# Patient Record
Sex: FEMALE | Race: WHITE
[De-identification: names, ages, dates, MRNs, and addresses within clinical notes are randomized per-mention and may not be internally consistent; named-entity substitution may affect disease eponyms.]

---

## 2017-11-17 ENCOUNTER — HOSPITAL ENCOUNTER (OUTPATIENT)
Dept: HOSPITAL 63 - SURG | Age: 72
Discharge: HOME | End: 2017-11-17
Attending: ANESTHESIOLOGY
Payer: MEDICARE

## 2017-11-17 DIAGNOSIS — M25.552: ICD-10-CM

## 2017-11-17 DIAGNOSIS — I25.10: ICD-10-CM

## 2017-11-17 DIAGNOSIS — M25.551: Primary | ICD-10-CM

## 2017-11-17 PROCEDURE — 99204 OFFICE O/P NEW MOD 45 MIN: CPT

## 2020-11-11 ENCOUNTER — HOSPITAL ENCOUNTER (EMERGENCY)
Dept: HOSPITAL 63 - ER | Age: 75
Discharge: HOME | End: 2020-11-11
Payer: MEDICARE

## 2020-11-11 VITALS — DIASTOLIC BLOOD PRESSURE: 97 MMHG | SYSTOLIC BLOOD PRESSURE: 153 MMHG

## 2020-11-11 VITALS — BODY MASS INDEX: 36.58 KG/M2 | HEIGHT: 64 IN | WEIGHT: 214.29 LBS

## 2020-11-11 DIAGNOSIS — M25.552: ICD-10-CM

## 2020-11-11 DIAGNOSIS — M25.551: ICD-10-CM

## 2020-11-11 DIAGNOSIS — G89.29: Primary | ICD-10-CM

## 2020-11-11 DIAGNOSIS — Z86.73: ICD-10-CM

## 2020-11-11 DIAGNOSIS — K59.00: ICD-10-CM

## 2020-11-11 DIAGNOSIS — F03.90: ICD-10-CM

## 2020-11-11 LAB
ALBUMIN SERPL-MCNC: 3.5 G/DL (ref 3.4–5)
ALBUMIN/GLOB SERPL: 1.2 {RATIO} (ref 1–1.7)
ALP SERPL-CCNC: 122 U/L (ref 46–116)
ALT SERPL-CCNC: 22 U/L (ref 14–59)
ANION GAP SERPL CALC-SCNC: 6 MMOL/L (ref 6–14)
APTT PPP: YELLOW S
AST SERPL-CCNC: 16 U/L (ref 15–37)
BACTERIA #/AREA URNS HPF: 0 /HPF
BASOPHILS # BLD AUTO: 0 X10^3/UL (ref 0–0.2)
BASOPHILS NFR BLD: 1 % (ref 0–3)
BILIRUB SERPL-MCNC: 0.5 MG/DL (ref 0.2–1)
BILIRUB UR QL STRIP: (no result)
BUN/CREAT SERPL: 12 (ref 6–20)
CA-I SERPL ISE-MCNC: 11 MG/DL (ref 7–20)
CALCIUM SERPL-MCNC: 9.1 MG/DL (ref 8.5–10.1)
CHLORIDE SERPL-SCNC: 108 MMOL/L (ref 98–107)
CO2 SERPL-SCNC: 29 MMOL/L (ref 21–32)
CREAT SERPL-MCNC: 0.9 MG/DL (ref 0.6–1)
EOSINOPHIL NFR BLD: 0.1 X10^3/UL (ref 0–0.7)
EOSINOPHIL NFR BLD: 2 % (ref 0–3)
ERYTHROCYTE [DISTWIDTH] IN BLOOD BY AUTOMATED COUNT: 13.3 % (ref 11.5–14.5)
FIBRINOGEN PPP-MCNC: CLEAR MG/DL
GFR SERPLBLD BASED ON 1.73 SQ M-ARVRAT: 61 ML/MIN
GLOBULIN SER-MCNC: 2.9 G/DL (ref 2.2–3.8)
GLUCOSE SERPL-MCNC: 100 MG/DL (ref 70–99)
GLUCOSE UR STRIP-MCNC: (no result) MG/DL
HCT VFR BLD CALC: 42.8 % (ref 36–47)
HGB BLD-MCNC: 14.4 G/DL (ref 12–15.5)
LIPASE: 92 U/L (ref 73–393)
LYMPHOCYTES # BLD: 2 X10^3/UL (ref 1–4.8)
LYMPHOCYTES NFR BLD AUTO: 38 % (ref 24–48)
MCH RBC QN AUTO: 31 PG (ref 25–35)
MCHC RBC AUTO-ENTMCNC: 34 G/DL (ref 31–37)
MCV RBC AUTO: 91 FL (ref 79–100)
MONO #: 0.4 X10^3/UL (ref 0–1.1)
MONOCYTES NFR BLD: 8 % (ref 0–9)
NEUT #: 2.7 X10^3UL (ref 1.8–7.7)
NEUTROPHILS NFR BLD AUTO: 51 % (ref 31–73)
NITRITE UR QL STRIP: (no result)
PLATELET # BLD AUTO: 203 X10^3/UL (ref 140–400)
POTASSIUM SERPL-SCNC: 4 MMOL/L (ref 3.5–5.1)
PROT SERPL-MCNC: 6.4 G/DL (ref 6.4–8.2)
RBC # BLD AUTO: 4.68 X10^6/UL (ref 3.5–5.4)
RBC #/AREA URNS HPF: (no result) /HPF (ref 0–2)
SODIUM SERPL-SCNC: 143 MMOL/L (ref 136–145)
SP GR UR STRIP: 1.01
SQUAMOUS #/AREA URNS LPF: (no result) /LPF
UROBILINOGEN UR-MCNC: 0.2 MG/DL
WBC # BLD AUTO: 5.2 X10^3/UL (ref 4–11)
WBC #/AREA URNS HPF: 0 /HPF (ref 0–4)

## 2020-11-11 PROCEDURE — 74177 CT ABD & PELVIS W/CONTRAST: CPT

## 2020-11-11 PROCEDURE — 80053 COMPREHEN METABOLIC PANEL: CPT

## 2020-11-11 PROCEDURE — 85025 COMPLETE CBC W/AUTO DIFF WBC: CPT

## 2020-11-11 PROCEDURE — 93005 ELECTROCARDIOGRAM TRACING: CPT

## 2020-11-11 PROCEDURE — 83690 ASSAY OF LIPASE: CPT

## 2020-11-11 PROCEDURE — 81001 URINALYSIS AUTO W/SCOPE: CPT

## 2020-11-11 PROCEDURE — 84484 ASSAY OF TROPONIN QUANT: CPT

## 2020-11-11 PROCEDURE — 99285 EMERGENCY DEPT VISIT HI MDM: CPT

## 2020-11-11 PROCEDURE — 73521 X-RAY EXAM HIPS BI 2 VIEWS: CPT

## 2020-11-11 PROCEDURE — 83880 ASSAY OF NATRIURETIC PEPTIDE: CPT

## 2020-11-11 PROCEDURE — 36415 COLL VENOUS BLD VENIPUNCTURE: CPT

## 2020-11-11 NOTE — RAD
HIP BILATERAL WITH PELVIS 11/11/2020 10:01 AM

 

INDICATION: Bilateral hip pain

 

COMPARISON: None available.

 

TECHNIQUE:  AP view the pelvis with a dedicated view of each hip are 

provided.

 

FINDINGS/

IMPRESSION:

 

1. No acute fracture or dislocation involving the pelvic ring. Superior 

and inferior pubic rami are intact. Sacroiliac joints are well aligned. 

Sacral qian appear intact. Iliac bones are normal in appearance. No 

suspicious osseous lesion is identified. Moderate to advanced lumbar 

spondylosis.

2. Advanced osteoarthrosis of the right hip with joint space narrowing, 

subcortical sclerosis and marginal osteophytosis. There is flattening of 

the femoral head with subcortical sclerosis which may be associated with 

osteonecrosis. There is either a inferior femoral osteophyte versus 

nondisplaced subcapital fracture (series 2, image 1).

3. There is moderate to advanced osteoarthrosis of the left hip with 

subcortical sclerosis, joint space narrowing and marginal osteophytosis. 

No acute fracture or dislocation of the left femur.

 

Electronically signed by: Shabnam Harvey MD (11/11/2020 12:58 PM) 

[id number]

## 2020-11-11 NOTE — EKG
[facility] [address], [place] [address]

Test Date:    2020               Test Time:    10:39:28

Pat Name:     LAUREL PANCHAL             Department:   

Patient ID:   [id number]           Room:          

Gender:       F                        Technician:   OLIVIA

:          1945               Requested By: CHARITY HEATON

Order Number: [id number]            Reading MD:     

                                 Measurements

Intervals                              Axis          

Rate:         59                       P:            230

NE:           192                      QRS:          57

QRSD:         72                       T:            61

QT:           430                                    

QTc:          430                                    

                           Interpretive Statements

SINUS RHYTHM

QRS(T) CONTOUR ABNORMALITY

CONSISTENT WITH ANTEROSEPTAL INFARCT

PROBABLY OLD

ABNORMAL ECG

RI6.02

No previous ECG available for comparison

## 2020-11-11 NOTE — RAD
EXAM: CT Abdomen and Pelvis with IV contrast

 

INDICATION: Reason: RLQ pain / Spl. Instructions:  / History: 

 

TECHNIQUE: Multi-detector row CT images were acquired from the lung bases 

through the abdomen and pelvis with the use of IV contrast. Sagittal and 

coronal images were acquired from the transaxial data. All CT scans 

performed at this facility utilize dose optimization techniques as 

appropriate to the exam, including the following: Automated exposure 

control and adjustment of the mA and/or KV according to patient size (this

includes techniques or standardized protocols for targeted exams where 

dose is indication/reason for exam).

 

IV CONTRAST: Administered

 

ORAL CONTRAST: Not administered

 

COMPARISON: None

 

FINDINGS: 

 

LOWER CHEST: Unremarkable

 

LIVER:  Unremarkable

 

BILIARY SYSTEM: Gallbladder is unremarkable. Bile ducts are not dilated.

 

PANCREAS:  Unremarkable

 

SPLEEN:  Unremarkable

 

ADRENALS:  Unremarkable

 

KIDNEYS & URETERS:  Unremarkable

 

BLADDER:  Moderately distended but otherwise unremarkable.

 

REPRODUCTIVE ORGANS:  Hysterectomy

 

GASTROINTESTINAL: Rectal fecal distention to 6 cm transverse diameter is 

present. Otherwise the stomach, small bowel, and colon are unremarkable. 

The appendix is not well seen but there are no findings of acute 

appendicitis..

 

MESENTERY/PERITONEUM/RETROPERITONEUM: Unremarkable

 

VASCULAR:  Unremarkable

 

LYMPH NODES:  No adenopathy

 

OSSEOUS & SOFT TISSUES:  Bilateral hip degenerative changes. No acute or

aggressive osseous lesions.

 

IMPRESSION:

 

Findings compatible with constipation. Otherwise no acute findings in the 

abdomen or pelvis on contrast enhanced CT.

 

Electronically signed by: Sherman Sesay MD (11/11/2020 1:08 PM) 

[id number]

## 2020-11-11 NOTE — PHYS DOC
Past History


Past Medical History:  CVA, Dementia


Past Medical History


Please defer to nursing documentation


Past Surgical History


Please defer to nursing documentation





Adult General


Chief Complaint


Chief Complaint:  HIP PAIN





[facility]


HPI





Patient is a 74-year-old female who presents for bilateral hip pain.  This is an

acute on chronic problem.  Patient arrives via EMS and limited history initially

obtained via EMS, history was subsequently provided by  who lives with 

patient.  Patient has dementia and is at baseline status.  Has chronic history 

of bilateral hip problems and has had MRIs performed on both, she deferred 

surgery approximately 5 years ago for continued medical management given risk vs

benefits conversation.  Patient deals with this hip pain daily but this has 

acutely worsened in the last 12 hours without any known inciting event or fall 

per her .  Patient took unknown dose of Tylenol at 1 AM.  She is 

typically able to ambulate and perform basic activities of daily living at home 

despite chronic right-sided deficits from previous CVA in 2012 but when she 

could not get up to sit at end of bed and start her day this morning around 8 

AM,  got concerned and called EMS for transport to our facility for 

evaluation.  On arrival, patient complains of bilateral hip pain.  Reports right

side being more significant than left.  Admits mild radiation into right flank. 

No fever, no recent COVID-19 contact, no chest pain, no shortness of breath, 

abdominal pain, nausea, vomit, diarrhea.  She is unknown if she has had any 

changes in urination





Review of Systems


Review of Systems


Fourteen body systems of review of systems have been reviewed. See HPI for 

pertinent positives and negative responses, other wise all other systems are 

negative, non-pertinent or non-contributory





Allergies


Allergies





Allergies








Coded Allergies Type Severity Reaction Last Updated Verified


 


  No Known Drug Allergies    11/11/20 No











Physical Exam


Physical Exam


Constitutional: Well developed, well nourished, no acute distress, non-toxic 

appearance.  At baseline mentation per 


HENT: Normocephalic, atraumatic, bilateral external ears normal, oropharynx 

moist, no oral exudates, nose normal. 


Eyes: PERRLA, EOMI, conjunctiva normal, no discharge.  


Neck: Normal range of motion, no tenderness, supple, no stridor.  


Cardiovascular: Heart rate regular, sinus rhythm, no murmurs rubs or gallops


Lungs & Thorax:  Bilateral breath sounds clear to auscultation 


Abdomen: Bowel sounds normal, distended, diffusely tender with guarding present,

no rebound, no masses, no pulsatile masses.  Positive heel strike.  Pelvic 

girdle stable to palpation, patient has focal tenderness to palpation of right 

greater trochanter head


Skin: Warm, dry, no erythema, no rash.  


Back: No tenderness, no CVA tenderness.  


Extremities: No tenderness, no cyanosis, no clubbing, ROM intact, no edema.  


Neurologic: Alert and oriented X 3, motor & sensory function at baseline for 

patient with residual right upper and lower extremity deficits secondary to 

prior CVA.  Cranial nerves II through XII intact


Psychologic: Affect normal, judgement normal, mood normal.





Current Patient Data


Vital Signs





Vital Signs








  Date Time  Temp Pulse Resp B/P (MAP) Pulse Ox O2 Delivery O2 Flow Rate FiO2


 


11/11/20 09:34 97.7 58 18 103/75 (84) 98 Room Air  











EKG


EKG


EKG ordered and interpreted by myself at 1040 hrs. as sinus rhythm at 59 bpm, 

unremarkable intervals, no axis deviation, no ischemic findings, no STEMI





Radiology/Procedures


Radiology/Procedures





PROCEDURE: HIP BILATERAL WITH PELVIS





HIP BILATERAL WITH PELVIS 11/11/2020 10:01 AM


 


INDICATION: Bilateral hip pain


 


COMPARISON: None available.


 


TECHNIQUE:  AP view the pelvis with a dedicated view of each hip are 


provided.


 


FINDINGS/


IMPRESSION:


 


1. No acute fracture or dislocation involving the pelvic ring. Superior 


and inferior pubic rami are intact. Sacroiliac joints are well aligned. 


Sacral qian appear intact. Iliac bones are normal in appearance. No 


suspicious osseous lesion is identified. Moderate to advanced lumbar 


spondylosis.


2. Advanced osteoarthrosis of the right hip with joint space narrowing, 


subcortical sclerosis and marginal osteophytosis. There is flattening of 


the femoral head with subcortical sclerosis which may be associated with 


osteonecrosis. There is either a inferior femoral osteophyte versus 


nondisplaced subcapital fracture (series 2, image 1).


3. There is moderate to advanced osteoarthrosis of the left hip with 


subcortical sclerosis, joint space narrowing and marginal osteophytosis. 


No acute fracture or dislocation of the left femur.


 


Electronically signed by: Shabnam Harvey MD (11/11/2020 12:58 PM) 


[id number]





==========================================








PROCEDURE: CT ABD PELV W/ IV CONTRST ONLY





EXAM: CT Abdomen and Pelvis with IV contrast


 


INDICATION: Reason: RLQ pain / Spl. Instructions:  / History: 


 


TECHNIQUE: Multi-detector row CT images were acquired from the lung bases 


through the abdomen and pelvis with the use of IV contrast. Sagittal and 


coronal images were acquired from the transaxial data. All CT scans 


performed at this facility utilize dose optimization techniques as 


appropriate to the exam, including the following: Automated exposure 


control and adjustment of the mA and/or KV according to patient size (this


includes techniques or standardized protocols for targeted exams where 


dose is indication/reason for exam).


 


IV CONTRAST: Administered


 


ORAL CONTRAST: Not administered


 


COMPARISON: None


 


FINDINGS: 


 


LOWER CHEST: Unremarkable


 


LIVER:  Unremarkable


 


BILIARY SYSTEM: Gallbladder is unremarkable. Bile ducts are not dilated.


 


PANCREAS:  Unremarkable


 


SPLEEN:  Unremarkable


 


ADRENALS:  Unremarkable


 


KIDNEYS & URETERS:  Unremarkable


 


BLADDER:  Moderately distended but otherwise unremarkable.


 


REPRODUCTIVE ORGANS:  Hysterectomy


 


GASTROINTESTINAL: Rectal fecal distention to 6 cm transverse diameter is 


present. Otherwise the stomach, small bowel, and colon are unremarkable. 


The appendix is not well seen but there are no findings of acute 


appendicitis..


 


MESENTERY/PERITONEUM/RETROPERITONEUM: Unremarkable


 


VASCULAR:  Unremarkable


 


LYMPH NODES:  No adenopathy


 


OSSEOUS & SOFT TISSUES:  Bilateral hip degenerative changes. No acute or


aggressive osseous lesions.


 


IMPRESSION:


 


Findings compatible with constipation. Otherwise no acute findings in the 


abdomen or pelvis on contrast enhanced CT.


 


Electronically signed by: Sherman Sesay MD (11/11/2020 1:08 PM) 


[id number]





Heart Score


Risk Factors:


Risk Factors:  DM, Current or recent (<one month) smoker, HTN, HLP, family 

history of CAD, obesity.


Risk Scores:


Risk Factors:  DM, Current or recent (<one month) smoker, HTN, HLP, family 

history of CAD, obesity.





Course & Med Decision Making


Course & Med Decision Making


Pertinent Labs and Imaging studies reviewed. (See chart for details)





Given history, exam, and workup, low suspicion for ICH, skull fx, spine fx or 

other acute spinal syndrome, PTX, pulmonary contusion, cardiac contusion, 

aortic/vertebral dissection, significant hemorrhage, extremity fracture.


Discussed most likely diagnosis of constipation.  Patient has known bilateral 

hip problems.  Patient lives at home with , I discussed case with him and

their son at length.  They report they are able to care for patient at home 

independently


They did report worsening mobility in the past few months without any known 

falls or inciting events, this is likely continued degeneration of bilateral 

hips.  I advised them to follow-up with PCP as soon as possible after this ER 

visit to discuss possibilities of home health versus SNF versus assisted living 

etc.


I educated patient, , and son on supportive care practices on how to best

treat patient's constipation.  She has been going daily but has had decreased 

p.o. fluid intake and decreased fiber.  I encouraged her to increase both of 

these in addition to adding MiraLAX supplement daily


Strict return precautions discussed with good understanding by all present, 

patient demonstrated baseline mobility function for myself, staff members and 

family present prior to discharge, all questions and concerns addressed prior to

ER departure in stable condition





Dragon Disclaimer


Dragon Disclaimer


This electronic medical record was generated, in whole or in part, using a voice

recognition dictation system.





Departure


Departure:


Impression:  


   Primary Impression:  


   Constipation


   Additional Impression:  


   Chronic hip pain, bilateral


Disposition:  01 DC HOME SELF CARE/HOMELESS


Condition:  STABLE


Referrals:  


SANJU MAYER (PCP)


Patient Instructions:  Constipation, Adult, Hip Pain





Additional Instructions:  


You have been evaluated in the Emergency Department today for abdominal pain. 


Your evaluation was not suggestive of any emergent condition requiring medical 

intervention at this time. However, some abdominal problems make take more time 

to appear. Therefore, it is important for you to watch for any new symptoms or 

worsening of your current condition. 


As discussed, most likely diagnosis after comprehensive evaluation was 

constipation in the setting of chronic bilateral hip pain.  I discussed 

potential need for admission and rehabilitation with consideration for 

outpatient skilled nursing facility/assisted living/home health set up


I discussed with you extensively that this can also be performed by your 

outpatient primary care physician.  Your father and son are confident in their 

abilities to provide care for you in the short-term until you can follow-up with

your PCP in upcoming 1 to 7 days


Return to the Emergency Department if you experience worsening pain, persistent 

fevers greater than 100.4, recurrent vomiting, blood in vomit, blood in stool, 

dark tarry stool, chest pain, difficulty breathing, or any other concerning 

symptoms.


Scripts


Polyethylene Glycol 3350 (MIRALAX) 119 Gm Powder


17 GM PO DAILY PRN for CONSTIPATION, #[phone] GM 0 Refills


   dissolve in water


   Prov: CHARITY HEATON DO         11/11/20





Problem Qualifiers











CHARITY HEATON DO                 Nov 11, 2020 10:02